# Patient Record
Sex: FEMALE | Race: OTHER
[De-identification: names, ages, dates, MRNs, and addresses within clinical notes are randomized per-mention and may not be internally consistent; named-entity substitution may affect disease eponyms.]

---

## 2018-05-15 ENCOUNTER — HOSPITAL ENCOUNTER (EMERGENCY)
Dept: HOSPITAL 74 - JERFT | Age: 48
Discharge: HOME | End: 2018-05-15
Payer: COMMERCIAL

## 2018-05-15 VITALS — BODY MASS INDEX: 32.7 KG/M2

## 2018-05-15 VITALS — TEMPERATURE: 98.5 F | SYSTOLIC BLOOD PRESSURE: 134 MMHG | HEART RATE: 103 BPM | DIASTOLIC BLOOD PRESSURE: 79 MMHG

## 2018-05-15 DIAGNOSIS — M54.41: Primary | ICD-10-CM

## 2018-05-15 DIAGNOSIS — J45.909: ICD-10-CM

## 2018-05-15 DIAGNOSIS — Z85.3: ICD-10-CM

## 2018-05-15 DIAGNOSIS — Z87.442: ICD-10-CM

## 2018-05-15 DIAGNOSIS — E78.00: ICD-10-CM

## 2018-05-15 DIAGNOSIS — M25.511: ICD-10-CM

## 2018-05-15 PROCEDURE — 3E0233Z INTRODUCTION OF ANTI-INFLAMMATORY INTO MUSCLE, PERCUTANEOUS APPROACH: ICD-10-PCS

## 2018-05-15 NOTE — PDOC
History of Present Illness





- General


Chief Complaint: Back Pain


Stated Complaint: BACK PAIN


Time Seen by Provider: 05/15/18 11:11





Past History





- Travel


Traveled outside of the country in the last 30 days: No


Close contact w/someone who was outside of country & ill: No





- Past Medical History


Allergies/Adverse Reactions: 


 Allergies











Allergy/AdvReac Type Severity Reaction Status Date / Time


 


No Known Allergies Allergy   Verified 05/15/18 11:05











Home Medications: 


Ambulatory Orders





Cyclobenzaprine HCl [Flexeril -] 10 mg PO HS #10 tablet 05/15/18 


Ibuprofen 800 mg PO TID #30 tablet 05/15/18 








Asthma: Yes


Cancer: Yes (rt breast)


COPD: No


 Disorders: Yes (Hx kidney stones)


Hypercholesterolemia: Yes





- Reproductive History


 (#): 3


Para: 2





- Immunization History


Immunization Up to Date: Yes





- Suicide/Smoking/Psychosocial Hx


Smoking History: Never smoked


Have you smoked in the past 12 months: No


Hx Alcohol Use: No


Drug/Substance Use Hx: No


Substance Use Type: None





**Review of Systems





- Review of Systems


Able to Perform ROS?: Yes


Comments:: 





05/15/18 11:14


CONSTITUTIONAL: 


Absent: fever, chills, diaphoresis, generalized weakness, malaise, loss of 

appetite


HEENT: 


Absent: rhinorrhea, nasal congestion, throat pain, throat swelling, difficulty 

swallowing, mouth swelling, ear pain, eye pain, visual Changes


CARDIOVASCULAR: 


Absent: chest pain, loss of consciousness, palpitations, irregular heart rate, 

peripheral edema


RESPIRATORY: 


Absent: cough, shortness of breath, dyspnea with exertion, orthopnea, wheezing, 

stridor, hemoptysis


GASTROINTESTINAL:


Absent: abdominal pain, abdominal distension, nausea, vomiting, diarrhea, 

constipation, melena, hematochezia


GENITOURINARY: 


Absent: dysuria, frequency, urgency, hesitancy, hematuria, flank pain, genital 

pain


MUSCULOSKELETAL: 


Present: bilateral low back pain Absent: arthralgia, joint swelling


SKIN: 


Absent: rash, itching, pallor


HEMATOLOGIC/IMMUNOLOGIC: 


Absent: easy bleeding, easy bruising, lymphadenopathy, frequent infections


ENDOCRINE:


Absent: unexplained weight gain, unexplained weight loss, heat intolerance, 

cold intolerance


NEUROLOGIC: 


Absent: headache, focal weakness or paresthesias, dizziness, unsteady gait, 

seizure, mental status changes, bladder or bowel incontinence


PSYCHIATRIC: 


Absent: anxiety, depression, suicidal or homicidal ideation, hallucinations.


Is the patient limited English proficient: No





*Physical Exam





- Vital Signs


 Last Vital Signs











Temp Pulse Resp BP Pulse Ox


 


 98.5 F   103 H  19   134/79   96 


 


 05/15/18 11:05  05/15/18 11:05  05/15/18 11:05  05/15/18 11:05  05/15/18 11:05














- Physical Exam


Comments: 





05/15/18 11:16


GENERAL:


Well developed, well nourished. Awake and alert. No acute distress.


MUSCULOSKELETAL 


Normal range of motion at all joints. No bony deformities or tenderness. No CVA 

tenderness.


EXTREMITIES: 


No cyanosis. No clubbing. No edema. No calf tenderness.


SKIN: 


Warm and dry. Normal capillary refill. No rashes. No jaundice. 


NEUROLOGICAL: 


Alert, awake, appropriate. Cranial nerves 2-12 intact. No deficits to light 

touch and temperature in face, upper extremities and lower extremities. No 

motor deficits in the in face, upper extremities and lower extremities. 

Normoreflexic in the upper and lower extremities. Normal speech. Toes are down-

going bilaterally. Gait is normal without ataxia.








*DC/Admit/Observation/Transfer


Diagnosis at time of Disposition: 


Low back pain


Qualifiers:


 Chronicity: acute Back pain laterality: right Sciatica presence: with sciatica 

Sciatica laterality: sciatica of right side Qualified Code(s): M54.41 - Lumbago 

with sciatica, right side





Right shoulder pain


Qualifiers:


 Chronicity: acute Qualified Code(s): M25.511 - Pain in right shoulder








- Discharge Dispostion


Disposition: HOME


Condition at time of disposition: Stable


Decision to Admit order: No





- Referrals


Referrals: 


Shun Bush PA [Primary Care Provider] - 


Bridgett Aviles MD [Staff Physician] - 





- Patient Instructions


Printed Discharge Instructions:  DI for Shoulder Pain, DI for Low Back Pain


Additional Instructions: 


You have low back pain due to a muscle spasm. Please take ibuprofen 800 mg 3 

times a day not to exceed 3000 mg a day. You were also prescribed Flexeril. 

Please  take the medication before you go to bed. Do not drive after taking 

this medication as it may make you sleepy. You may use warm compresses on your 

back to help with her symptoms. Please follow-up with your primary care doctor. 

If your symptoms do not resolve in 3-5 days, follow-up with your primary care 

doctor. A referral has been provided for you.





You also have R shoulder pain. Your x-ray showed a radiolucency (light spot) on 

the scapula. This is an inconclusive finding, but you need to follow up with 

your primary care doctor and oncologist within 24-48 hours.





Return to the emergency department if you have worsening back pain, bladder or 

bowel incontinence, numbness and tingling in her legs, changes in the way you 

walk, or any new or worsening symptoms.





- Post Discharge Activity


Forms/Work/School Notes:  Back to Work

## 2019-08-15 ENCOUNTER — FORM ENCOUNTER (OUTPATIENT)
Age: 49
End: 2019-08-15

## 2022-03-29 PROBLEM — Z00.00 ENCOUNTER FOR PREVENTIVE HEALTH EXAMINATION: Status: ACTIVE | Noted: 2022-03-29

## 2022-04-14 DIAGNOSIS — Z86.39 PERSONAL HISTORY OF OTHER ENDOCRINE, NUTRITIONAL AND METABOLIC DISEASE: ICD-10-CM

## 2022-04-14 DIAGNOSIS — Z80.6 FAMILY HISTORY OF LEUKEMIA: ICD-10-CM

## 2022-04-14 DIAGNOSIS — Z86.79 PERSONAL HISTORY OF OTHER DISEASES OF THE CIRCULATORY SYSTEM: ICD-10-CM

## 2022-04-14 DIAGNOSIS — Z80.0 FAMILY HISTORY OF MALIGNANT NEOPLASM OF DIGESTIVE ORGANS: ICD-10-CM

## 2022-04-14 DIAGNOSIS — Z87.09 PERSONAL HISTORY OF OTHER DISEASES OF THE RESPIRATORY SYSTEM: ICD-10-CM

## 2022-04-14 DIAGNOSIS — R92.2 INCONCLUSIVE MAMMOGRAM: ICD-10-CM

## 2022-04-14 DIAGNOSIS — Z85.3 PERSONAL HISTORY OF MALIGNANT NEOPLASM OF BREAST: ICD-10-CM

## 2022-04-15 ENCOUNTER — APPOINTMENT (OUTPATIENT)
Dept: BREAST CENTER | Facility: CLINIC | Age: 52
End: 2022-04-15

## 2022-12-27 ENCOUNTER — APPOINTMENT (OUTPATIENT)
Dept: BREAST CENTER | Facility: CLINIC | Age: 52
End: 2022-12-27

## 2022-12-27 VITALS
BODY MASS INDEX: 33.66 KG/M2 | OXYGEN SATURATION: 95 % | HEART RATE: 97 BPM | SYSTOLIC BLOOD PRESSURE: 132 MMHG | HEIGHT: 63 IN | WEIGHT: 190 LBS | DIASTOLIC BLOOD PRESSURE: 87 MMHG

## 2022-12-27 DIAGNOSIS — Z78.9 OTHER SPECIFIED HEALTH STATUS: ICD-10-CM

## 2022-12-27 DIAGNOSIS — R92.1 MAMMOGRAPHIC CALCIFICATION FOUND ON DIAGNOSTIC IMAGING OF BREAST: ICD-10-CM

## 2022-12-27 DIAGNOSIS — R92.8 OTHER ABNORMAL AND INCONCLUSIVE FINDINGS ON DIAGNOSTIC IMAGING OF BREAST: ICD-10-CM

## 2022-12-27 DIAGNOSIS — Z80.3 FAMILY HISTORY OF MALIGNANT NEOPLASM OF BREAST: ICD-10-CM

## 2022-12-27 PROCEDURE — 99203 OFFICE O/P NEW LOW 30 MIN: CPT

## 2022-12-27 RX ORDER — VALSARTAN 40 MG/1
TABLET, COATED ORAL
Refills: 0 | Status: ACTIVE | COMMUNITY

## 2022-12-27 RX ORDER — FUROSEMIDE 80 MG/1
TABLET ORAL
Refills: 0 | Status: ACTIVE | COMMUNITY

## 2022-12-27 RX ORDER — ATORVASTATIN CALCIUM 80 MG/1
TABLET, FILM COATED ORAL
Refills: 0 | Status: ACTIVE | COMMUNITY

## 2022-12-27 RX ORDER — METOPROLOL SUCCINATE 25 MG/1
25 TABLET, EXTENDED RELEASE ORAL
Refills: 0 | Status: ACTIVE | COMMUNITY

## 2022-12-27 NOTE — PHYSICAL EXAM
[No Supraclavicular Adenopathy] : no supraclavicular adenopathy [No Cervical Adenopathy] : no cervical adenopathy [Clear to Auscultation Bilat] : clear to auscultation bilaterally [Examined in the supine and seated position] : examined in the supine and seated position [Asymmetrical] : asymmetrical [No dominant masses] : no dominant masses in right breast  [No dominant masses] : no dominant masses left breast [No Nipple Retraction] : no left nipple retraction [No Nipple Discharge] : no left nipple discharge [No Axillary Lymphadenopathy] : no left axillary lymphadenopathy [No Rashes] : no rashes [de-identified] : In the upper half of the right breast is a 8 cm subcutaneous freely mobile soft lipoma.

## 2022-12-27 NOTE — HISTORY OF PRESENT ILLNESS
[FreeTextEntry1] : 52-year-old postmenopausal woman prior history of breast cancer has a recent screening mammogram which shows indeterminate suspicious microcalcifications in the periareolar 6:00 region of the posterior right breast, BI-RADS 4.  Patient denies any breast masses or nipple discharge.  In 2007 patient had a right partial mastectomy with sentinel node biopsy for a 9 mm well differentiated ductal carcinoma that was ER/AL positive HER2 negative with a negative sentinel node.  She had postoperative radiation and took tamoxifen for 5 years.  Since then she has been disease-free.  Her mother breast cancer at age 50 and patient gene tested negative in the past.  Patient has had her longtime right chest wall mass consistent with a lipoma.  Patient has never taken hormone replacement therapy.

## 2023-01-13 ENCOUNTER — RESULT REVIEW (OUTPATIENT)
Age: 53
End: 2023-01-13

## 2023-01-17 ENCOUNTER — NON-APPOINTMENT (OUTPATIENT)
Age: 53
End: 2023-01-17

## 2023-01-18 ENCOUNTER — NON-APPOINTMENT (OUTPATIENT)
Age: 53
End: 2023-01-18

## 2023-01-25 ENCOUNTER — NON-APPOINTMENT (OUTPATIENT)
Age: 53
End: 2023-01-25

## 2023-01-30 ENCOUNTER — RESULT REVIEW (OUTPATIENT)
Age: 53
End: 2023-01-30

## 2023-01-31 ENCOUNTER — NON-APPOINTMENT (OUTPATIENT)
Age: 53
End: 2023-01-31

## 2023-02-09 ENCOUNTER — RESULT REVIEW (OUTPATIENT)
Age: 53
End: 2023-02-09

## 2023-02-16 ENCOUNTER — NON-APPOINTMENT (OUTPATIENT)
Age: 53
End: 2023-02-16

## 2023-02-17 ENCOUNTER — APPOINTMENT (OUTPATIENT)
Dept: BREAST CENTER | Facility: CLINIC | Age: 53
End: 2023-02-17
Payer: COMMERCIAL

## 2023-02-17 VITALS
HEART RATE: 93 BPM | DIASTOLIC BLOOD PRESSURE: 89 MMHG | HEIGHT: 63 IN | WEIGHT: 188 LBS | SYSTOLIC BLOOD PRESSURE: 133 MMHG | OXYGEN SATURATION: 95 % | BODY MASS INDEX: 33.31 KG/M2

## 2023-02-17 DIAGNOSIS — Z90.11 ACQUIRED ABSENCE OF RIGHT BREAST AND NIPPLE: ICD-10-CM

## 2023-02-17 PROCEDURE — 99214 OFFICE O/P EST MOD 30 MIN: CPT

## 2023-02-17 NOTE — HISTORY OF PRESENT ILLNESS
[FreeTextEntry1] : 2007 right partial mastectomy with sentinel node biopsy for 9 mm well differentiated ductal carcinoma, ER/[place] positive HER2 -0/1 node\par Whole breast radiation, tamoxifen for 5 years\par \par Right breast calcifications, right MRI abnormality\par Both showed intermediate to high-grade ductal carcinoma in situ, ER/KY positive\par \par Patient comes in after the biopsies which she tolerated very well.  MRI showed the 2 clips were sick centimeters apart in the wound of the areas there is a 5 cm area of enhancement around the original biopsy.  Patient tells me she wants mastectomy.\par \par 52-year-old postmenopausal woman prior history of breast cancer has a recent screening mammogram which shows indeterminate suspicious microcalcifications in the periareolar 6:00 region of the posterior right breast, BI-RADS 4.  Patient denies any breast masses or nipple discharge.  In 2007 patient had a right partial mastectomy with sentinel node biopsy for a 9 mm well differentiated ductal carcinoma that was ER/KY positive HER2 negative with a negative sentinel node.  She had postoperative radiation and took tamoxifen for 5 years.  Since then she has been disease-free.  Her mother breast cancer at age 50 and patient gene tested negative in the past.  Patient has had her longtime right chest wall mass consistent with a lipoma.  Patient has never taken hormone replacement therapy.

## 2023-02-17 NOTE — PHYSICAL EXAM
[Examined in the supine and seated position] : examined in the supine and seated position [Asymmetrical] : asymmetrical [No dominant masses] : no dominant masses in right breast  [No dominant masses] : no dominant masses left breast [No Nipple Retraction] : no left nipple retraction [No Nipple Discharge] : no left nipple discharge [de-identified] : In the upper half of the right breast is a 8 cm subcutaneous freely mobile soft lipoma.  The right breast is significantly smaller than the left.

## 2023-02-24 ENCOUNTER — NON-APPOINTMENT (OUTPATIENT)
Age: 53
End: 2023-02-24

## 2023-02-27 ENCOUNTER — NON-APPOINTMENT (OUTPATIENT)
Age: 53
End: 2023-02-27

## 2023-03-06 ENCOUNTER — NON-APPOINTMENT (OUTPATIENT)
Age: 53
End: 2023-03-06

## 2023-03-13 ENCOUNTER — OFFICE (OUTPATIENT)
Dept: URBAN - METROPOLITAN AREA CLINIC 30 | Facility: CLINIC | Age: 53
Setting detail: OPHTHALMOLOGY
End: 2023-03-13
Payer: COMMERCIAL

## 2023-03-13 DIAGNOSIS — H16.223: ICD-10-CM

## 2023-03-13 DIAGNOSIS — H47.233: ICD-10-CM

## 2023-03-13 DIAGNOSIS — H43.813: ICD-10-CM

## 2023-03-13 DIAGNOSIS — Z85.20: ICD-10-CM

## 2023-03-13 DIAGNOSIS — H35.463: ICD-10-CM

## 2023-03-13 PROCEDURE — 92014 COMPRE OPH EXAM EST PT 1/>: CPT | Performed by: OPHTHALMOLOGY

## 2023-03-13 ASSESSMENT — SPHEQUIV_DERIVED
OD_SPHEQUIV: -6
OD_SPHEQUIV: -6.25
OS_SPHEQUIV: -6
OS_SPHEQUIV: -6.125

## 2023-03-13 ASSESSMENT — REFRACTION_AUTOREFRACTION
OD_SPHERE: -6.75
OS_AXIS: 175
OS_CYLINDER: +0.75
OD_CYLINDER: +1.00
OD_AXIS: 170
OS_SPHERE: -6.50

## 2023-03-13 ASSESSMENT — TONOMETRY
OD_IOP_MMHG: 14
OS_IOP_MMHG: 14

## 2023-03-13 ASSESSMENT — REFRACTION_CURRENTRX
OD_ADD: +2.25
OS_OVR_VA: 20/
OS_AXIS: 180
OD_OVR_VA: 20/
OD_SPHERE: -6.50
OS_ADD: +2.25
OS_SPHERE: -6.50
OD_CYLINDER: +1.00
OD_AXIS: 170
OS_CYLINDER: +1.00

## 2023-03-13 ASSESSMENT — REFRACTION_MANIFEST
OS_CYLINDER: +1.00
OD_VA1: 20/25
OS_AXIS: 180
OS_VA1: 20/25
OD_SPHERE: +1.75
OD_ADD: +2.25
OD_AXIS: 170
OS_SPHERE: PLANO
OD_CYLINDER: +1.00
OS_SPHERE: -6.50
OD_SPHERE: -6.50
OS_ADD: +2.25

## 2023-03-13 ASSESSMENT — CONFRONTATIONAL VISUAL FIELD TEST (CVF)
OS_FINDINGS: FULL
OD_FINDINGS: FULL

## 2023-03-13 ASSESSMENT — VISUAL ACUITY
OS_BCVA: 20/20
OD_BCVA: 20/70

## 2023-03-28 ENCOUNTER — NON-APPOINTMENT (OUTPATIENT)
Age: 53
End: 2023-03-28

## 2023-03-28 ENCOUNTER — APPOINTMENT (OUTPATIENT)
Dept: BREAST CENTER | Facility: CLINIC | Age: 53
End: 2023-03-28
Payer: COMMERCIAL

## 2023-03-28 VITALS
BODY MASS INDEX: 33.31 KG/M2 | HEART RATE: 85 BPM | OXYGEN SATURATION: 96 % | TEMPERATURE: 98.3 F | HEIGHT: 63 IN | WEIGHT: 188 LBS

## 2023-03-28 DIAGNOSIS — R93.89 ABNORMAL FINDINGS ON DIAGNOSTIC IMAGING OF OTHER SPECIFIED BODY STRUCTURES: ICD-10-CM

## 2023-03-28 DIAGNOSIS — D05.11 INTRADUCTAL CARCINOMA IN SITU OF RIGHT BREAST: ICD-10-CM

## 2023-03-28 PROCEDURE — 99213 OFFICE O/P EST LOW 20 MIN: CPT

## 2023-03-28 NOTE — PHYSICAL EXAM
[No Supraclavicular Adenopathy] : no supraclavicular adenopathy [No Cervical Adenopathy] : no cervical adenopathy [Clear to Auscultation Bilat] : clear to auscultation bilaterally [Examined in the supine and seated position] : examined in the supine and seated position [Asymmetrical] : asymmetrical [No dominant masses] : no dominant masses in right breast  [No dominant masses] : no dominant masses left breast [No Nipple Retraction] : no left nipple retraction [No Nipple Discharge] : no left nipple discharge [No Axillary Lymphadenopathy] : no left axillary lymphadenopathy [No Rashes] : no rashes [de-identified] : In the upper half of the right breast is a 8 cm subcutaneous freely mobile soft lipoma.  The right breast is significantly smaller than the left.

## 2023-03-28 NOTE — HISTORY OF PRESENT ILLNESS
[FreeTextEntry1] : 2007 right partial mastectomy with sentinel node biopsy for 9 mm well differentiated ductal carcinoma, ER/MA positive HER2 -0/1 node\par Whole breast radiation, tamoxifen for 5 years\par \par Right breast calcifications, right MRI abnormality\par Both showed intermediate to high-grade ductal carcinoma in situ, ER/MA positive\par \par Patient comes in after the biopsies which she tolerated very well.  MRI showed the 2 clips were six centimeters apart in the wound of the areas there is a 5 cm area of enhancement around the original biopsy.  Patient tells me she wants bilateral mastectomy.  Patient has seen plastic surgery we will be getting a D IEP flap at that time\par \par 52-year-old postmenopausal woman prior history of breast cancer has a recent screening mammogram which shows indeterminate suspicious microcalcifications in the periareolar 6:00 region of the posterior right breast, BI-RADS 4.  Patient denies any breast masses or nipple discharge.  In 2007 patient had a right partial mastectomy with sentinel node biopsy for a 9 mm well differentiated ductal carcinoma that was ER/MA positive HER2 negative with a negative sentinel node.  She had postoperative radiation and took tamoxifen for 5 years.  Since then she has been disease-free.  Her mother breast cancer at age 50 and patient gene tested negative in the past.  Patient has had her longtime right chest wall mass consistent with a lipoma.  Patient has never taken hormone replacement therapy.

## 2023-04-17 ENCOUNTER — RESULT REVIEW (OUTPATIENT)
Age: 53
End: 2023-04-17

## 2023-04-19 ENCOUNTER — RESULT REVIEW (OUTPATIENT)
Age: 53
End: 2023-04-19

## 2023-04-20 ENCOUNTER — APPOINTMENT (OUTPATIENT)
Dept: BREAST CENTER | Facility: HOSPITAL | Age: 53
End: 2023-04-20

## 2023-04-20 ENCOUNTER — TRANSCRIPTION ENCOUNTER (OUTPATIENT)
Age: 53
End: 2023-04-20

## 2023-04-21 ENCOUNTER — TRANSCRIPTION ENCOUNTER (OUTPATIENT)
Age: 53
End: 2023-04-21

## 2023-04-27 ENCOUNTER — NON-APPOINTMENT (OUTPATIENT)
Age: 53
End: 2023-04-27

## 2023-05-01 DIAGNOSIS — Z85.3 PERSONAL HISTORY OF MALIGNANT NEOPLASM OF BREAST: ICD-10-CM

## 2023-05-02 ENCOUNTER — APPOINTMENT (OUTPATIENT)
Dept: BREAST CENTER | Facility: CLINIC | Age: 53
End: 2023-05-02
Payer: COMMERCIAL

## 2023-05-02 VITALS
SYSTOLIC BLOOD PRESSURE: 94 MMHG | OXYGEN SATURATION: 97 % | BODY MASS INDEX: 33.3 KG/M2 | WEIGHT: 188 LBS | HEART RATE: 84 BPM | DIASTOLIC BLOOD PRESSURE: 63 MMHG

## 2023-05-02 DIAGNOSIS — Z90.13 ACQUIRED ABSENCE OF BILATERAL BREASTS AND NIPPLES: ICD-10-CM

## 2023-05-02 DIAGNOSIS — Z85.3 PERSONAL HISTORY OF MALIGNANT NEOPLASM OF BREAST: ICD-10-CM

## 2023-05-02 PROCEDURE — 99024 POSTOP FOLLOW-UP VISIT: CPT

## 2023-05-02 NOTE — HISTORY OF PRESENT ILLNESS
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  However, she has had some loss of skin in the umbilicus as well as in the skin flaps.  Patient denies any fever or chills.  Plastic surgery is planning later on this month a debridement with latissimus flap reconstruction.\par \par Right breast calcifications, right MRI abnormality\par Both showed intermediate to high-grade ductal carcinoma in situ, ER/HI positive\par MRI showed the 2 clips were six centimeters apart in the wound of the areas there is a 5 cm area of enhancement around the original biopsy.  Patient tells me she wants bilateral mastectomy.  Patient has seen plastic surgery we will be getting a D IEP flap at that time\par \par 52-year-old postmenopausal woman prior history of breast cancer has a recent screening mammogram which shows indeterminate suspicious microcalcifications in the periareolar 6:00 region of the posterior right breast, BI-RADS 4.  Patient denies any breast masses or nipple discharge.  In 2007 patient had a right partial mastectomy with sentinel node biopsy for a 9 mm well differentiated ductal carcinoma that was ER/[place] positive HER2 negative with a negative sentinel node.  She had postoperative radiation and took tamoxifen for 5 years.  Since then she has been disease-free.  Her mother breast cancer at age 50 and patient gene tested negative in the past.  Patient has had her longtime right chest wall mass consistent with a lipoma.  Patient has never taken hormone replacement therapy.

## 2023-05-02 NOTE — PHYSICAL EXAM
[de-identified] : Around both incisions there is full-thickness necrosis with blackening right greater than left but no signs of infection.

## 2023-05-12 ENCOUNTER — HOSPITAL ENCOUNTER (INPATIENT)
Dept: HOSPITAL 74 - FM/S | Age: 53
LOS: 2 days | Discharge: HOME | DRG: 583 | End: 2023-05-14
Attending: PLASTIC SURGERY | Admitting: PLASTIC SURGERY
Payer: COMMERCIAL

## 2023-05-12 VITALS — BODY MASS INDEX: 32.1 KG/M2

## 2023-05-12 DIAGNOSIS — D05.82: ICD-10-CM

## 2023-05-12 DIAGNOSIS — N64.1: ICD-10-CM

## 2023-05-12 DIAGNOSIS — D05.81: Primary | ICD-10-CM

## 2023-05-12 RX ADMIN — SODIUM CHLORIDE, POTASSIUM CHLORIDE, SODIUM LACTATE AND CALCIUM CHLORIDE SCH MLS: 600; 310; 30; 20 INJECTION, SOLUTION INTRAVENOUS at 21:40

## 2023-05-12 RX ADMIN — METOPROLOL TARTRATE SCH MG: 25 TABLET, FILM COATED ORAL at 22:02

## 2023-05-12 RX ADMIN — ATORVASTATIN CALCIUM SCH MG: 10 TABLET, FILM COATED ORAL at 22:01

## 2023-05-13 VITALS — RESPIRATION RATE: 18 BRPM

## 2023-05-13 LAB
DEPRECATED RDW RBC AUTO: 13.5 % (ref 11.6–15.6)
HCT VFR BLD CALC: 32 % (ref 32.4–45.2)
HGB BLD-MCNC: 10.5 G/DL (ref 10.7–15.3)
MCH RBC QN AUTO: 29.3 PG (ref 25.7–33.7)
MCHC RBC AUTO-ENTMCNC: 32.8 G/DL (ref 32–36)
MCV RBC: 89.5 FL (ref 80–96)
PLATELET # BLD AUTO: 424.2 10^3/UL (ref 134–434)
PMV BLD: 9.3 FL (ref 7.5–11.1)
RBC # BLD AUTO: 3.58 10^6/UL (ref 3.6–5.2)
WBC # BLD AUTO: 22.6 10^3/UL (ref 4–10.8)

## 2023-05-13 PROCEDURE — 4A1GXSH MONITORING OF SKIN AND BREAST VASCULAR PERFUSION USING INDOCYANINE GREEN DYE, EXTERNAL APPROACH: ICD-10-PCS | Performed by: PLASTIC SURGERY

## 2023-05-13 PROCEDURE — 0HRV075 REPLACEMENT OF BILATERAL BREAST USING LATISSIMUS DORSI MYOCUTANEOUS FLAP, OPEN APPROACH: ICD-10-PCS | Performed by: PLASTIC SURGERY

## 2023-05-13 PROCEDURE — 0HB5XZZ EXCISION OF CHEST SKIN, EXTERNAL APPROACH: ICD-10-PCS | Performed by: PLASTIC SURGERY

## 2023-05-13 PROCEDURE — 0HPU0NZ REMOVAL OF TISSUE EXPANDER FROM LEFT BREAST, OPEN APPROACH: ICD-10-PCS | Performed by: PLASTIC SURGERY

## 2023-05-13 PROCEDURE — 0HRU0JZ REPLACEMENT OF LEFT BREAST WITH SYNTHETIC SUBSTITUTE, OPEN APPROACH: ICD-10-PCS | Performed by: PLASTIC SURGERY

## 2023-05-13 PROCEDURE — 0HPT0NZ REMOVAL OF TISSUE EXPANDER FROM RIGHT BREAST, OPEN APPROACH: ICD-10-PCS | Performed by: PLASTIC SURGERY

## 2023-05-13 RX ADMIN — HEPARIN SODIUM SCH UNIT: 5000 INJECTION, SOLUTION INTRAVENOUS; SUBCUTANEOUS at 21:03

## 2023-05-13 RX ADMIN — HEPARIN SODIUM SCH UNIT: 5000 INJECTION, SOLUTION INTRAVENOUS; SUBCUTANEOUS at 10:07

## 2023-05-13 RX ADMIN — METOPROLOL TARTRATE SCH MG: 25 TABLET, FILM COATED ORAL at 10:09

## 2023-05-13 RX ADMIN — ATORVASTATIN CALCIUM SCH MG: 10 TABLET, FILM COATED ORAL at 21:04

## 2023-05-13 RX ADMIN — SODIUM CHLORIDE, POTASSIUM CHLORIDE, SODIUM LACTATE AND CALCIUM CHLORIDE SCH MLS/HR: 600; 310; 30; 20 INJECTION, SOLUTION INTRAVENOUS at 21:02

## 2023-05-13 RX ADMIN — METOPROLOL TARTRATE SCH: 25 TABLET, FILM COATED ORAL at 21:04

## 2023-05-13 RX ADMIN — CEFAZOLIN SCH MLS/HR: 1 INJECTION, POWDER, FOR SOLUTION INTRAVENOUS at 10:07

## 2023-05-13 RX ADMIN — CEFAZOLIN SCH MLS/HR: 1 INJECTION, POWDER, FOR SOLUTION INTRAVENOUS at 18:46

## 2023-05-13 RX ADMIN — CEFAZOLIN SCH MLS/HR: 1 INJECTION, POWDER, FOR SOLUTION INTRAVENOUS at 03:03

## 2023-05-13 RX ADMIN — CEFAZOLIN SCH MLS/HR: 1 INJECTION, POWDER, FOR SOLUTION INTRAVENOUS at 03:04

## 2023-05-14 VITALS — SYSTOLIC BLOOD PRESSURE: 103 MMHG | TEMPERATURE: 99 F | DIASTOLIC BLOOD PRESSURE: 61 MMHG

## 2023-05-14 VITALS — HEART RATE: 100 BPM

## 2023-05-14 RX ADMIN — CEFAZOLIN SCH MLS/HR: 1 INJECTION, POWDER, FOR SOLUTION INTRAVENOUS at 01:30

## 2023-05-14 RX ADMIN — METOPROLOL TARTRATE SCH MG: 25 TABLET, FILM COATED ORAL at 09:31

## 2023-05-14 RX ADMIN — CEFAZOLIN SCH MLS/HR: 1 INJECTION, POWDER, FOR SOLUTION INTRAVENOUS at 09:27

## 2023-05-14 RX ADMIN — HEPARIN SODIUM SCH UNIT: 5000 INJECTION, SOLUTION INTRAVENOUS; SUBCUTANEOUS at 09:33

## 2023-06-12 ENCOUNTER — TRANSCRIPTION ENCOUNTER (OUTPATIENT)
Age: 53
End: 2023-06-12

## 2023-09-11 ENCOUNTER — APPOINTMENT (OUTPATIENT)
Dept: BREAST CENTER | Facility: CLINIC | Age: 53
End: 2023-09-11